# Patient Record
Sex: MALE | Race: WHITE | Employment: OTHER | URBAN - NONMETROPOLITAN AREA
[De-identification: names, ages, dates, MRNs, and addresses within clinical notes are randomized per-mention and may not be internally consistent; named-entity substitution may affect disease eponyms.]

---

## 2017-08-22 ENCOUNTER — HOSPITAL ENCOUNTER (OUTPATIENT)
Dept: PHYSICAL THERAPY | Age: 72
Setting detail: THERAPIES SERIES
Discharge: HOME | End: 2017-08-22
Payer: MEDICARE

## 2017-08-22 PROCEDURE — G0283 ELEC STIM OTHER THAN WOUND: HCPCS

## 2017-08-22 PROCEDURE — G8979 MOBILITY GOAL STATUS: HCPCS

## 2017-08-22 PROCEDURE — G8978 MOBILITY CURRENT STATUS: HCPCS

## 2017-08-22 PROCEDURE — 97161 PT EVAL LOW COMPLEX 20 MIN: CPT

## 2017-08-22 PROCEDURE — 97035 APP MDLTY 1+ULTRASOUND EA 15: CPT

## 2017-08-24 ENCOUNTER — HOSPITAL ENCOUNTER (OUTPATIENT)
Dept: PHYSICAL THERAPY | Age: 72
Setting detail: THERAPIES SERIES
Discharge: HOME | End: 2017-08-24
Payer: MEDICARE

## 2017-08-24 PROCEDURE — 97110 THERAPEUTIC EXERCISES: CPT

## 2017-08-24 PROCEDURE — 97035 APP MDLTY 1+ULTRASOUND EA 15: CPT

## 2017-08-29 ENCOUNTER — HOSPITAL ENCOUNTER (OUTPATIENT)
Dept: PHYSICAL THERAPY | Age: 72
Setting detail: THERAPIES SERIES
Discharge: HOME | End: 2017-08-29
Payer: MEDICARE

## 2017-08-29 PROCEDURE — G0283 ELEC STIM OTHER THAN WOUND: HCPCS

## 2017-08-29 PROCEDURE — 97110 THERAPEUTIC EXERCISES: CPT

## 2017-08-29 PROCEDURE — 97035 APP MDLTY 1+ULTRASOUND EA 15: CPT

## 2017-08-31 ENCOUNTER — HOSPITAL ENCOUNTER (OUTPATIENT)
Dept: PHYSICAL THERAPY | Age: 72
Setting detail: THERAPIES SERIES
Discharge: HOME | End: 2017-08-31
Payer: MEDICARE

## 2017-08-31 PROCEDURE — 97110 THERAPEUTIC EXERCISES: CPT

## 2017-08-31 PROCEDURE — 97035 APP MDLTY 1+ULTRASOUND EA 15: CPT

## 2017-08-31 PROCEDURE — G0283 ELEC STIM OTHER THAN WOUND: HCPCS

## 2017-09-05 ENCOUNTER — HOSPITAL ENCOUNTER (OUTPATIENT)
Dept: PHYSICAL THERAPY | Age: 72
Setting detail: THERAPIES SERIES
Discharge: HOME | End: 2017-09-05
Payer: MEDICARE

## 2017-09-05 PROCEDURE — G0283 ELEC STIM OTHER THAN WOUND: HCPCS

## 2017-09-05 PROCEDURE — 97110 THERAPEUTIC EXERCISES: CPT

## 2017-09-05 PROCEDURE — 97035 APP MDLTY 1+ULTRASOUND EA 15: CPT

## 2017-09-05 ASSESSMENT — PAIN SCALES - GENERAL: PAINLEVEL_OUTOF10: 4

## 2017-09-07 ENCOUNTER — HOSPITAL ENCOUNTER (OUTPATIENT)
Dept: PHYSICAL THERAPY | Age: 72
Setting detail: THERAPIES SERIES
Discharge: HOME | End: 2017-09-07
Payer: MEDICARE

## 2017-09-07 PROCEDURE — 97035 APP MDLTY 1+ULTRASOUND EA 15: CPT

## 2017-09-07 PROCEDURE — 97110 THERAPEUTIC EXERCISES: CPT

## 2017-09-07 ASSESSMENT — PAIN SCALES - GENERAL: PAINLEVEL_OUTOF10: 1

## 2017-09-12 ENCOUNTER — HOSPITAL ENCOUNTER (OUTPATIENT)
Dept: PHYSICAL THERAPY | Age: 72
Setting detail: THERAPIES SERIES
Discharge: HOME | End: 2017-09-12
Payer: MEDICARE

## 2017-09-12 PROCEDURE — 97035 APP MDLTY 1+ULTRASOUND EA 15: CPT

## 2017-09-12 PROCEDURE — 97110 THERAPEUTIC EXERCISES: CPT

## 2017-09-12 PROCEDURE — G0283 ELEC STIM OTHER THAN WOUND: HCPCS

## 2017-09-14 ENCOUNTER — APPOINTMENT (OUTPATIENT)
Dept: PHYSICAL THERAPY | Age: 72
End: 2017-09-14
Payer: MEDICARE

## 2017-09-15 ENCOUNTER — HOSPITAL ENCOUNTER (OUTPATIENT)
Dept: PHYSICAL THERAPY | Age: 72
Setting detail: THERAPIES SERIES
Discharge: HOME | End: 2017-09-15
Payer: MEDICARE

## 2017-09-15 PROCEDURE — G0283 ELEC STIM OTHER THAN WOUND: HCPCS

## 2017-09-15 PROCEDURE — 97110 THERAPEUTIC EXERCISES: CPT

## 2017-09-15 PROCEDURE — 97035 APP MDLTY 1+ULTRASOUND EA 15: CPT

## 2017-09-18 ENCOUNTER — HOSPITAL ENCOUNTER (OUTPATIENT)
Dept: PHYSICAL THERAPY | Age: 72
Setting detail: THERAPIES SERIES
Discharge: HOME | End: 2017-09-18
Payer: MEDICARE

## 2017-09-18 PROCEDURE — 97110 THERAPEUTIC EXERCISES: CPT

## 2017-09-18 PROCEDURE — G8979 MOBILITY GOAL STATUS: HCPCS

## 2017-09-18 PROCEDURE — G0283 ELEC STIM OTHER THAN WOUND: HCPCS

## 2017-09-18 PROCEDURE — G8978 MOBILITY CURRENT STATUS: HCPCS

## 2017-09-19 ENCOUNTER — APPOINTMENT (OUTPATIENT)
Dept: PHYSICAL THERAPY | Age: 72
End: 2017-09-19
Payer: MEDICARE

## 2017-09-21 ENCOUNTER — HOSPITAL ENCOUNTER (OUTPATIENT)
Dept: PHYSICAL THERAPY | Age: 72
Setting detail: THERAPIES SERIES
Discharge: HOME | End: 2017-09-21
Payer: MEDICARE

## 2017-09-21 PROCEDURE — G0283 ELEC STIM OTHER THAN WOUND: HCPCS

## 2017-09-21 PROCEDURE — 97110 THERAPEUTIC EXERCISES: CPT

## 2017-09-21 PROCEDURE — 97035 APP MDLTY 1+ULTRASOUND EA 15: CPT

## 2017-09-21 ASSESSMENT — PAIN SCALES - GENERAL: PAINLEVEL_OUTOF10: 3

## 2017-09-26 ENCOUNTER — HOSPITAL ENCOUNTER (OUTPATIENT)
Dept: PHYSICAL THERAPY | Age: 72
Setting detail: THERAPIES SERIES
Discharge: HOME | End: 2017-09-26
Payer: MEDICARE

## 2017-09-26 PROCEDURE — 97035 APP MDLTY 1+ULTRASOUND EA 15: CPT

## 2017-09-26 PROCEDURE — 97110 THERAPEUTIC EXERCISES: CPT

## 2017-09-26 PROCEDURE — G0283 ELEC STIM OTHER THAN WOUND: HCPCS

## 2017-09-26 ASSESSMENT — PAIN SCALES - GENERAL: PAINLEVEL_OUTOF10: 5

## 2017-09-29 ENCOUNTER — HOSPITAL ENCOUNTER (OUTPATIENT)
Dept: PHYSICAL THERAPY | Age: 72
Setting detail: THERAPIES SERIES
Discharge: HOME | End: 2017-09-29
Payer: MEDICARE

## 2017-09-29 PROCEDURE — G0283 ELEC STIM OTHER THAN WOUND: HCPCS

## 2017-09-29 PROCEDURE — 97110 THERAPEUTIC EXERCISES: CPT

## 2017-09-29 PROCEDURE — 97035 APP MDLTY 1+ULTRASOUND EA 15: CPT

## 2017-09-29 ASSESSMENT — PAIN DESCRIPTION - LOCATION: LOCATION: KNEE

## 2017-09-29 ASSESSMENT — PAIN SCALES - GENERAL: PAINLEVEL_OUTOF10: 2

## 2017-09-29 ASSESSMENT — PAIN DESCRIPTION - ORIENTATION: ORIENTATION: LEFT

## 2018-03-13 ENCOUNTER — APPOINTMENT (OUTPATIENT)
Dept: PHYSICAL THERAPY | Age: 73
End: 2018-03-13
Payer: MEDICARE

## 2018-03-23 ENCOUNTER — HOSPITAL ENCOUNTER (OUTPATIENT)
Dept: PHYSICAL THERAPY | Age: 73
Setting detail: THERAPIES SERIES
Discharge: HOME | End: 2018-03-23
Payer: MEDICARE

## 2018-03-23 PROCEDURE — G8978 MOBILITY CURRENT STATUS: HCPCS

## 2018-03-23 PROCEDURE — 97110 THERAPEUTIC EXERCISES: CPT

## 2018-03-23 PROCEDURE — 97161 PT EVAL LOW COMPLEX 20 MIN: CPT

## 2018-03-23 PROCEDURE — G8979 MOBILITY GOAL STATUS: HCPCS

## 2018-03-23 NOTE — PLAN OF CARE
[facility]           Phone: [phone]             Outpatient Physical Therapy  Fax: [phone]                                           Date: 3/23/2018  Patient: Marty Benito : 1945 [facility] #: [id number]   Referring Practitioner:  Nikolas Obando MD Referral Date:  18       [x] Plan of Care   [] Updated Plan of Care    Dates of Service to Include: 3/23/2018 to 18    Diagnosis:  Primay OA of left knee, M17.12, Effusion of left knee, M25.462    Rehab (Treatment) Diagnosis:  s/p left TKA, decreased ROM, strength             Onset Date:  18    Attendance  Total # of Visits to Date: 1 No Show: 0 Canceled Appointment: 0    Assessment  Body structures, Functions, Activity limitations: Decreased functional mobility , Decreased ADL status, Decreased ROM, Decreased strength, Decreased endurance, Decreased balance  Assessment: The patient is a 68 y.o. male who presents s/p left TKA. Patient had bood soaked gauze and patient reported nurse advised PT to remove to look for active bleeding. No active bleeding observed and patient was bandaged. Nurse notified of condition. Patient demonstates decreased knee ROM, strength, decreased balance, and antalgic gait pattern. Patient would benefit from skilled PT for any combination of therex/act, modalities, and manual techniques to address these deficits.     [] Primary Impairment :   G Code:    [x] Mobility         [] Carry        [] Body Position       [] Self Care      [] Other:   Functional Impairment Current:  [] 0%    [] 1-19% [] 20-39% [x] 40-59% [] 60-79%    [] 80-99% [] 100%  Functional Impairment Goal:  [] 0%    [x] 1-19% [] 20-39% [] 40-59% [] 60-79%    [] 80-99% [] 100%  G Code Functional Impairment determined by:  [x] Clinical Judgment   [] Outcome Measure:     Goals  Short term goals  Time Frame for Short term goals: 2 weeks  Short term goal 1:

## 2018-03-23 NOTE — PROGRESS NOTES
Phone: [facility]          Fax: [phone]                      Outpatient Physical Therapy                                                                      Evaluation  Date: 3/23/2018  Patient: Eddie Finney  : 1945  [facility] #: [id number]  Referring Practitioner: Haleigh Crowe MD    Referral Date : 18     Diagnosis: Primay OA of left knee, M17.12, Effusion of left knee, M25.462    Treatment Diagnosis: s/p left TKA, decreased ROM, strength  Onset Date: 18  PT Insurance Information: [employer]  Total # of Visits Approved: 12   Total # of Visits to Date: 1  No Show: 0  Canceled Appointment: 0     Subjective  Subjective: Patient reports L TKA 3/20/18. He reports pain ranges from 2/10 to 8/10 at worst.  He reports pain has increased the last couple of days and reports his scar has been bleeding. He reports he has been negotiating steps without issue. Patient reports he's been performing his HEP without issue. He wants to get back to golfing and walking long distances. Additional Pertinent Hx: HTN    Objective     Observation/Palpation  Observation: Bandage blood soaked, upon observing wound no open areas noted. Reapplyed gauze and tegaderm patch  Body Mechanics: Patient ambulates with FWW, mild antalgic gait pattern     Strength RLE  Strength RLE: WNL  Strength LLE  Strength LLE: Exception  L Hip Flexion: 3+/5  L Knee Flexion: 4/5  L Knee Extension: 3+/5    Assessment  Body structures, Functions, Activity limitations: Decreased functional mobility , Decreased ADL status, Decreased ROM, Decreased strength, Decreased endurance, Decreased balance  Assessment: The patient is a 68 y.o. male who presents s/p left TKA. Patient had bood soaked gauze and patient reported nurse advised PT to remove to look for active bleeding. No active bleeding observed and patient was bandaged. Nurse notified of condition.   Patient demonstates decreased knee

## 2018-03-26 ENCOUNTER — HOSPITAL ENCOUNTER (OUTPATIENT)
Dept: PHYSICAL THERAPY | Age: 73
Setting detail: THERAPIES SERIES
Discharge: HOME | End: 2018-03-26
Payer: MEDICARE

## 2018-03-28 ENCOUNTER — HOSPITAL ENCOUNTER (OUTPATIENT)
Dept: PHYSICAL THERAPY | Age: 73
Setting detail: THERAPIES SERIES
Discharge: HOME | End: 2018-03-28
Payer: MEDICARE

## 2018-03-28 PROCEDURE — G0283 ELEC STIM OTHER THAN WOUND: HCPCS

## 2018-03-28 PROCEDURE — 97110 THERAPEUTIC EXERCISES: CPT

## 2018-03-28 NOTE — PROGRESS NOTES
[]Met   []Partially met  []Not met   Short term goal 2: Patient will be able to ambulate with straight cane. []Met   []Partially met  []Not met      []Met   []Partially met  []Not met      []Met   []Partially met  []Not met     Long Term Goals - Time Frame for Long term goals : 4 weeks  Long term goal 1: Patient will be independent and compliant with a HEP []Met  []Partially met  []Not met   Long term goal 2: Patient will improve L knee ROM to 0-130 degrees for ADL performance []Met  []Partially met  []Not met   Long term goal 3: Patient will improve L LE strength to 5/5 for ambulation and golf.  []Met  []Partially met  []Not met     []Met  []Partially met  []Not met     []Met  []Partially met  []Not met       Minutes Tracking:  Time In: [address]  Time Out: [place]  Minutes: [facility]  PTA [id number] Date: 3/28/2018

## 2018-03-30 ENCOUNTER — HOSPITAL ENCOUNTER (OUTPATIENT)
Dept: PHYSICAL THERAPY | Age: 73
Setting detail: THERAPIES SERIES
Discharge: HOME | End: 2018-03-30
Payer: MEDICARE

## 2018-03-30 PROCEDURE — G0283 ELEC STIM OTHER THAN WOUND: HCPCS

## 2018-03-30 PROCEDURE — 97110 THERAPEUTIC EXERCISES: CPT

## 2018-04-02 ENCOUNTER — HOSPITAL ENCOUNTER (OUTPATIENT)
Dept: PHYSICAL THERAPY | Age: 73
Setting detail: THERAPIES SERIES
Discharge: HOME | End: 2018-04-02
Payer: MEDICARE

## 2018-04-02 PROCEDURE — 97110 THERAPEUTIC EXERCISES: CPT

## 2018-04-02 PROCEDURE — 97032 APPL MODALITY 1+ESTIM EA 15: CPT

## 2018-04-04 ENCOUNTER — HOSPITAL ENCOUNTER (OUTPATIENT)
Dept: PHYSICAL THERAPY | Age: 73
Setting detail: THERAPIES SERIES
Discharge: HOME | End: 2018-04-04
Payer: MEDICARE

## 2018-04-04 PROCEDURE — 97110 THERAPEUTIC EXERCISES: CPT

## 2018-04-04 PROCEDURE — G0283 ELEC STIM OTHER THAN WOUND: HCPCS

## 2018-04-06 ENCOUNTER — HOSPITAL ENCOUNTER (OUTPATIENT)
Dept: PHYSICAL THERAPY | Age: 73
Setting detail: THERAPIES SERIES
Discharge: HOME | End: 2018-04-06
Payer: MEDICARE

## 2018-04-06 PROCEDURE — 97110 THERAPEUTIC EXERCISES: CPT

## 2018-04-06 PROCEDURE — G0283 ELEC STIM OTHER THAN WOUND: HCPCS

## 2018-04-09 ENCOUNTER — HOSPITAL ENCOUNTER (OUTPATIENT)
Dept: PHYSICAL THERAPY | Age: 73
Setting detail: THERAPIES SERIES
Discharge: HOME | End: 2018-04-09
Payer: MEDICARE

## 2018-04-09 PROCEDURE — G0283 ELEC STIM OTHER THAN WOUND: HCPCS

## 2018-04-09 PROCEDURE — 97110 THERAPEUTIC EXERCISES: CPT

## 2018-04-11 ENCOUNTER — HOSPITAL ENCOUNTER (OUTPATIENT)
Dept: PHYSICAL THERAPY | Age: 73
Setting detail: THERAPIES SERIES
Discharge: HOME | End: 2018-04-11
Payer: MEDICARE

## 2018-04-11 PROCEDURE — G0283 ELEC STIM OTHER THAN WOUND: HCPCS

## 2018-04-11 PROCEDURE — 97116 GAIT TRAINING THERAPY: CPT

## 2018-04-11 PROCEDURE — G8978 MOBILITY CURRENT STATUS: HCPCS

## 2018-04-11 PROCEDURE — 97110 THERAPEUTIC EXERCISES: CPT

## 2018-04-11 PROCEDURE — G8979 MOBILITY GOAL STATUS: HCPCS

## 2018-04-13 ENCOUNTER — HOSPITAL ENCOUNTER (OUTPATIENT)
Dept: PHYSICAL THERAPY | Age: 73
Setting detail: THERAPIES SERIES
Discharge: HOME | End: 2018-04-13
Payer: MEDICARE

## 2018-04-13 PROCEDURE — 97110 THERAPEUTIC EXERCISES: CPT

## 2018-04-13 PROCEDURE — G0283 ELEC STIM OTHER THAN WOUND: HCPCS

## 2018-04-13 PROCEDURE — 97140 MANUAL THERAPY 1/> REGIONS: CPT

## 2018-04-16 ENCOUNTER — HOSPITAL ENCOUNTER (OUTPATIENT)
Dept: PHYSICAL THERAPY | Age: 73
Setting detail: THERAPIES SERIES
Discharge: HOME | End: 2018-04-16
Payer: MEDICARE

## 2018-04-16 PROCEDURE — 97110 THERAPEUTIC EXERCISES: CPT

## 2018-04-16 PROCEDURE — G0283 ELEC STIM OTHER THAN WOUND: HCPCS

## 2018-04-18 ENCOUNTER — HOSPITAL ENCOUNTER (OUTPATIENT)
Dept: PHYSICAL THERAPY | Age: 73
Setting detail: THERAPIES SERIES
Discharge: HOME | End: 2018-04-18
Payer: MEDICARE

## 2018-04-18 PROCEDURE — 97110 THERAPEUTIC EXERCISES: CPT

## 2018-04-18 PROCEDURE — 97140 MANUAL THERAPY 1/> REGIONS: CPT

## 2018-04-20 ENCOUNTER — HOSPITAL ENCOUNTER (OUTPATIENT)
Dept: PHYSICAL THERAPY | Age: 73
Setting detail: THERAPIES SERIES
Discharge: HOME | End: 2018-04-20
Payer: MEDICARE

## 2018-04-20 PROCEDURE — 97110 THERAPEUTIC EXERCISES: CPT

## 2018-04-20 PROCEDURE — 97140 MANUAL THERAPY 1/> REGIONS: CPT

## 2018-04-23 ENCOUNTER — HOSPITAL ENCOUNTER (OUTPATIENT)
Dept: PHYSICAL THERAPY | Age: 73
Setting detail: THERAPIES SERIES
Discharge: HOME | End: 2018-04-23
Payer: MEDICARE

## 2018-04-23 PROCEDURE — 97110 THERAPEUTIC EXERCISES: CPT

## 2018-04-25 ENCOUNTER — HOSPITAL ENCOUNTER (OUTPATIENT)
Dept: PHYSICAL THERAPY | Age: 73
Setting detail: THERAPIES SERIES
Discharge: HOME | End: 2018-04-25
Payer: MEDICARE

## 2018-04-25 PROCEDURE — 97116 GAIT TRAINING THERAPY: CPT

## 2018-04-25 PROCEDURE — 97110 THERAPEUTIC EXERCISES: CPT

## 2018-04-27 ENCOUNTER — HOSPITAL ENCOUNTER (OUTPATIENT)
Dept: PHYSICAL THERAPY | Age: 73
Setting detail: THERAPIES SERIES
Discharge: HOME | End: 2018-04-27
Payer: MEDICARE

## 2018-04-27 PROCEDURE — 97110 THERAPEUTIC EXERCISES: CPT

## 2018-04-30 ENCOUNTER — HOSPITAL ENCOUNTER (OUTPATIENT)
Dept: PHYSICAL THERAPY | Age: 73
Setting detail: THERAPIES SERIES
Discharge: HOME | End: 2018-04-30
Payer: MEDICARE

## 2018-04-30 PROCEDURE — 97110 THERAPEUTIC EXERCISES: CPT

## 2018-05-02 ENCOUNTER — HOSPITAL ENCOUNTER (OUTPATIENT)
Dept: PHYSICAL THERAPY | Age: 73
Setting detail: THERAPIES SERIES
Discharge: HOME | End: 2018-05-02
Payer: MEDICARE

## 2018-05-02 PROCEDURE — G8978 MOBILITY CURRENT STATUS: HCPCS

## 2018-05-02 PROCEDURE — G8979 MOBILITY GOAL STATUS: HCPCS

## 2018-05-02 PROCEDURE — 97110 THERAPEUTIC EXERCISES: CPT

## 2018-05-04 ENCOUNTER — HOSPITAL ENCOUNTER (OUTPATIENT)
Dept: PHYSICAL THERAPY | Age: 73
Setting detail: THERAPIES SERIES
Discharge: HOME | End: 2018-05-04
Payer: MEDICARE

## 2018-05-04 PROCEDURE — 97110 THERAPEUTIC EXERCISES: CPT

## 2018-05-04 PROCEDURE — G8978 MOBILITY CURRENT STATUS: HCPCS

## 2018-05-04 PROCEDURE — G8980 MOBILITY D/C STATUS: HCPCS

## 2018-05-04 PROCEDURE — G8979 MOBILITY GOAL STATUS: HCPCS

## 2018-12-07 NOTE — DISCHARGE SUMMARY
Phone: Fernando          Fax: [phone]                            Outpatient Physical Therapy                                                                    Discharge Summary    Patient: Albertina Persaud  : 1945  CSN #: [id number]   Referring physician: No admitting provider for patient encounter. Referring Practitioner: Alexandru Zavala MD      Diagnosis: Primay OA of left knee, M17.12, Effusion of left knee, M25.462      Date Treatment Initiated: 3/23/18   Date of Last Treatment: 18      PT Visit Information  Onset Date: 18  PT Insurance Information: [employer] Medicare  Total # of Visits Approved: 20  Total # of Visits to Date:   Plan of Care/Certification Expiration Date: 18  No Show: 0  Canceled Appointment: 0      Frequency/Duration    3 times per week    4 weeks      Treatment Received  [x]HP/CP      [x]Electrical Stim   [x]Therapeutic Exercise      [x]Gait Training  []Aquatics   []Ultrasound         [x]Patient Education/HEP   [x]Manual Therapy  []Traction    [x]Neuro-philomena        [x]Soft Tissue Mobs            []Home TENS  []Iontophoresis    []Orthotic casting/fitting      []Dry Needling    Assessment  Assessment: Pt. has met most goals at this time and reported 100% improvement in L knee. No further visits scheduled. Will d/c patient at this time. Goals  Short term goals  Time Frame for Short term goals: 2 weeks  Short term goal 1: Patient will be initiated with a HEP. -MET  Short term goal 2: Patient will be able to ambulate with straight cane - MET    Long term goals  Time Frame for Long term goals : 4 weeks  Long term goal 1: Patient will be independent and compliant with a HEP -MET  Long term goal 2: Patient will improve L knee ROM to 0-130 degrees for ADL performance- progressing  Long term goal 3: Patient will improve L LE strength to 5/5 for ambulation and golf.  -MET      Reason for Discharge  [x] Goals Achieved [] Poor Follow Through/Attendance                  [] Optimal Function Achieved     [] Patient Discharged Self    [] Hospitalization                         [] Physician discharge      Thank you for this referral      Valencia Zepeda, PT, DPT                    Date: 12/7/2018

## 2020-10-02 PROBLEM — F32.A DEPRESSIVE DISORDER: Status: ACTIVE | Noted: 2020-10-02

## 2020-10-02 PROBLEM — I10 HTN (HYPERTENSION): Status: ACTIVE | Noted: 2020-10-02

## 2020-10-02 PROBLEM — M15.9 ENDEMIC GENERALIZED OSTEO-ARTHROSIS: Status: ACTIVE | Noted: 2020-10-02

## 2020-10-02 PROBLEM — E78.5 HLD (HYPERLIPIDEMIA): Status: ACTIVE | Noted: 2020-10-02

## 2020-10-02 PROBLEM — G43.909 MIGRAINE: Chronic | Status: ACTIVE | Noted: 2020-10-02

## 2020-10-02 PROBLEM — Z79.899 OTHER LONG TERM (CURRENT) DRUG THERAPY: Status: ACTIVE | Noted: 2020-10-02

## 2020-10-02 PROBLEM — G47.00 INSOMNIA: Status: ACTIVE | Noted: 2020-10-02

## 2020-10-02 PROBLEM — E55.9 VITAMIN D DEFICIENCY: Status: ACTIVE | Noted: 2020-10-02

## 2020-10-02 PROBLEM — H10.10 ACUTE ATOPIC CONJUNCTIVITIS: Status: ACTIVE | Noted: 2020-10-02

## 2020-10-02 PROBLEM — Z12.5 SCREENING FOR MALIGNANT NEOPLASM OF PROSTATE: Status: ACTIVE | Noted: 2020-10-02

## 2020-10-02 PROBLEM — R53.83 FATIGUE: Status: ACTIVE | Noted: 2020-10-02

## 2020-10-06 PROBLEM — H25.11 AGE-RELATED NUCLEAR CATARACT OF RIGHT EYE: Status: ACTIVE | Noted: 2019-02-12

## 2020-11-01 PROBLEM — Z12.5 SCREENING FOR MALIGNANT NEOPLASM OF PROSTATE: Status: RESOLVED | Noted: 2020-10-02 | Resolved: 2020-11-01
